# Patient Record
Sex: FEMALE | Race: WHITE
[De-identification: names, ages, dates, MRNs, and addresses within clinical notes are randomized per-mention and may not be internally consistent; named-entity substitution may affect disease eponyms.]

---

## 2018-01-14 ENCOUNTER — HOSPITAL ENCOUNTER (EMERGENCY)
Dept: HOSPITAL 25 - UCCORT | Age: 53
Discharge: HOME | End: 2018-01-14
Payer: COMMERCIAL

## 2018-01-14 VITALS — DIASTOLIC BLOOD PRESSURE: 69 MMHG | SYSTOLIC BLOOD PRESSURE: 104 MMHG

## 2018-01-14 DIAGNOSIS — J45.909: ICD-10-CM

## 2018-01-14 DIAGNOSIS — J20.9: Primary | ICD-10-CM

## 2018-01-14 PROCEDURE — G0463 HOSPITAL OUTPT CLINIC VISIT: HCPCS

## 2018-01-14 PROCEDURE — 99212 OFFICE O/P EST SF 10 MIN: CPT

## 2018-01-14 NOTE — UC
Respiratory Complaint HPI





- HPI Summary


HPI Summary: 





increased cough and SOB, no fever.





- History of Current Complaint


Chief Complaint: UCRespiratory


Stated Complaint: UPPER RESP


Time Seen by Provider: 01/14/18 16:55


Hx Obtained From: Patient


Hx Last Menstrual Period: 11/26/16


Pregnant?: No


Onset/Duration: Sudden Onset, Lasting Days


Timing: Constant


Severity Initially: Moderate


Severity Currently: Moderate





- Allergies/Home Medications


Allergies/Adverse Reactions: 


 Allergies











Allergy/AdvReac Type Severity Reaction Status Date / Time


 


No Known Allergies Allergy   Verified 01/14/18 16:52











Home Medications: 


 Home Medications





Pravastatin Sodium [Pravachol] 40 mg PO DAILY 01/14/18 [History Confirmed 01/14/ 18]











PMH/Surg Hx/FS Hx/Imm Hx


Previously Healthy: Yes





- Surgical History


Surgical History: Yes


Surgery Procedure, Year, and Place: VARICOSE VEIN SX.  BREAST ENHANCEMENT





- Family History


Known Family History: Positive: Hypertension, Respiratory Disease





- Social History


Alcohol Use: Weekly


Substance Use Type: None


Smoking Status (MU): Never Smoked Tobacco





Review of Systems


Constitutional: Negative


Skin: Negative


Eyes: Negative


ENT: Negative


Respiratory: Shortness Of Breath, Cough


Cardiovascular: Negative


Gastrointestinal: Negative


Genitourinary: Negative


Motor: Negative


Neurovascular: Negative


Musculoskeletal: Negative


Neurological: Negative


Psychological: Negative


Is Patient Immunocompromised?: No


All Other Systems Reviewed And Are Negative: Yes





Physical Exam


Triage Information Reviewed: Yes


Appearance: Well-Appearing, Well-Nourished, Ill-Appearing


Vital Signs: 


 Initial Vital Signs











Temp  97.5 F   01/14/18 16:48


 


Pulse  115   01/14/18 16:48


 


Resp  16   01/14/18 16:48


 


BP  104/69   01/14/18 16:48


 


Pulse Ox  99   01/14/18 16:48











Vital Signs Reviewed: Yes


Eye Exam: Normal


ENT: Positive: Pharyngeal erythema, TM bulging


Dental Exam: Normal


Neck exam: Normal


Neck: Positive: Supple, Nontender, No Lymphadenopathy


Respiratory Exam: Normal


Respiratory: Positive: Chest non-tender, No respiratory distress, No accessory 

muscle use, Rhonchi, Wheezing, Expiration, Inspiration, Other: - harsh cough


Cardiovascular: Positive: No Murmur, Pulses Normal, Tachycardia


Bowel Sounds: Positive: Present


Musculoskeletal Exam: Normal


Musculoskeletal: Positive: Strength Intact, ROM Intact, No Edema


Neurological Exam: Normal


Psychological Exam: Normal


Skin Exam: Normal





UC Diagnostic Evaluation





- Laboratory


O2 Sat by Pulse Oximetry: 99





Respiratory Course/Dx





- Course


Course Of Treatment: hx obtained, exam performed ,meds reviewed, treated for 

bronchitis





- Differential Dx/Diagnosis


Differential Diagnosis/HQI/PQRI: Asthma, Bronchitis, CHF, Lower Resp Infection, 

Sinusitis


Provider Diagnoses: hx of asthma.  acute bronchtis





Discharge





- Discharge Plan


Condition: Stable


Disposition: HOME


Prescriptions: 


Albuterol HFA INHALER* [Ventolin HFA Inhaler*] 2 puff INH Q4H PRN #1 mdi


 PRN Reason: Sob/Wheezing


Azithromyxin MONTSE (NF) [Z-Montse (Zithromax) 250 mg tabs #6] 2 tab PO .TODAY, THEN 

1 DAILY #6 tab


predniSONE TAB* [Deltasone TAB*] 40 mg PO DAILY #14 tab


Patient Education Materials:  Acute Bronchitis (ED)


Referrals: 


Angelique Mendoza MD [Primary Care Provider] - 


Additional Instructions: 


take the medications as prescribed.


follow up with Dr Mendoza as needed.

## 2019-09-25 ENCOUNTER — HOSPITAL ENCOUNTER (EMERGENCY)
Dept: HOSPITAL 25 - UCCORT | Age: 54
Discharge: HOME | End: 2019-09-25
Payer: COMMERCIAL

## 2019-09-25 VITALS — SYSTOLIC BLOOD PRESSURE: 116 MMHG | DIASTOLIC BLOOD PRESSURE: 71 MMHG

## 2019-09-25 DIAGNOSIS — K11.20: Primary | ICD-10-CM

## 2019-09-25 PROCEDURE — 99212 OFFICE O/P EST SF 10 MIN: CPT

## 2019-09-25 PROCEDURE — G0463 HOSPITAL OUTPT CLINIC VISIT: HCPCS

## 2019-09-25 NOTE — ED
Throat Pain/Nasal Congestion





- HPI Summary


HPI Summary: 





54 yr old with the complaint of right sided facial swelling and mild pain.  

Onset over past three days.  She had filler put in her face in the area three 

months ago.  She has not had fever or chills.  She has mild nasal congestion.   

No trouble swallowing.  No change in voice.  





- History of Current Complaint


Chief Complaint: UCGeneralIllness


Time Seen by Provider: 09/25/19 16:50





- Allergies/Home Medications


Allergies/Adverse Reactions: 


 Allergies











Allergy/AdvReac Type Severity Reaction Status Date / Time


 


No Known Allergies Allergy   Verified 09/25/19 16:38














PMH/Surg Hx/FS Hx/Imm Hx





- Surgical History


Surgery Procedure, Year, and Place: VARICOSE VEIN SX.  BREAST ENHANCEMENT


Infectious Disease History: No


Infectious Disease History: 


   Denies: Traveled Outside the [place] in Last 30 Days





- Family History


Known Family History: Positive: Hypertension, Respiratory Disease





- Social History


Occupation: Employed Full-time


Alcohol Use: Weekly


Substance Use Type: Reports: None


Smoking Status (MU): Never Smoked Tobacco





Review of Systems


Constitutional: Negative


Positive: Other - right side parotid gland swelling


All Other Systems Reviewed And Are Negative: Yes





Physical Exam


Triage Information Reviewed: Yes


Vital Signs On Initial Exam: 


 Initial Vitals











Temp Pulse Resp BP Pulse Ox


 


 98.6 F   63   16   116/71   100 


 


 09/25/19 16:34  09/25/19 16:34  09/25/19 16:34  09/25/19 16:34  09/25/19 16:34











Vital Signs Reviewed: Yes


Appearance: Positive: Well-Appearing, No Pain Distress


Skin: Positive: Warm, Skin Color Reflects Adequate Perfusion


Head/Face: Positive: Normal Head/Face Inspection


Eyes: Positive: EOMI


ENT: Positive: Pharynx normal, TMs normal, Other - right parotid glad mild 

enlarged with mild tenderness. Teeth appear within Normal limits without 

gingival swelling.  She has no redness to skin of face..  Negative: Sinus 

tenderness


Neck: Positive: Supple, Nontender


Respiratory/Lung Sounds: Positive: Clear to Auscultation, Breath Sounds Present


Cardiovascular: Positive: RRR.  Negative: Murmur


Abdomen Description: Negative: Distended


Musculoskeletal: Positive: Strength/ROM Intact


Neurological: Positive: Sensory/Motor Intact, Alert, Oriented to Person Place, 

Time, CN Intact II-III, Normal Gait, Speech Normal


Psychiatric: Positive: Normal





Diagnostics





- Vital Signs


 Vital Signs











  Temp Pulse Resp BP Pulse Ox


 


 09/25/19 16:34  98.6 F  63  16  116/71  100














- Laboratory


Lab Statement: Any lab studies that have been ordered have been reviewed, and 

results considered in the medical decision making process.





EENT Course/Dx





- Course


Course Of Treatment: 54 yr old with probable sialoadenitis, but also could have 

some localized reaction to the facial filler put in her face.  She will see Dr Travis in follow up.





- Diagnoses


Provider Diagnoses: 


 Sialoadenitis








Discharge ED





- Sign-Out/Discharge


Documenting (check all that apply): Patient Departure


All imaging exams completed and their final reports reviewed: No Studies





- Discharge Plan


Condition: Good


Disposition: HOME


Prescriptions: 


Amoxicillin/Clavulanate TAB* [Augmentin *] 875 mg PO BID #20 tab


Patient Education Materials:  Sialoadenitis (ED)


Referrals: 


Angelique Mendoza MD [Primary Care Provider] - 


Jean-Claude Travis MD [Medical Doctor] - 1 Day





- Billing Disposition and Condition


Condition: GOOD


Disposition: Home